# Patient Record
Sex: FEMALE | Race: WHITE | NOT HISPANIC OR LATINO
[De-identification: names, ages, dates, MRNs, and addresses within clinical notes are randomized per-mention and may not be internally consistent; named-entity substitution may affect disease eponyms.]

---

## 2018-03-09 PROBLEM — Z00.00 ENCOUNTER FOR PREVENTIVE HEALTH EXAMINATION: Status: ACTIVE | Noted: 2018-03-09

## 2018-03-12 ENCOUNTER — APPOINTMENT (OUTPATIENT)
Dept: DERMATOLOGY | Facility: CLINIC | Age: 78
End: 2018-03-12
Payer: MEDICARE

## 2018-03-12 VITALS — WEIGHT: 150 LBS | BODY MASS INDEX: 24.99 KG/M2 | HEIGHT: 65 IN

## 2018-03-12 DIAGNOSIS — Z41.1 ENCOUNTER FOR COSMETIC SURGERY: ICD-10-CM

## 2018-03-12 DIAGNOSIS — Z85.89 PERSONAL HISTORY OF MALIGNANT NEOPLASM OF OTHER ORGANS AND SYSTEMS: ICD-10-CM

## 2018-03-12 DIAGNOSIS — Z80.9 FAMILY HISTORY OF MALIGNANT NEOPLASM, UNSPECIFIED: ICD-10-CM

## 2018-03-12 DIAGNOSIS — Z85.828 PERSONAL HISTORY OF OTHER MALIGNANT NEOPLASM OF SKIN: ICD-10-CM

## 2018-03-12 DIAGNOSIS — Z87.891 PERSONAL HISTORY OF NICOTINE DEPENDENCE: ICD-10-CM

## 2018-03-12 DIAGNOSIS — F41.9 ANXIETY DISORDER, UNSPECIFIED: ICD-10-CM

## 2018-03-12 PROCEDURE — 17003 DESTRUCT PREMALG LES 2-14: CPT

## 2018-03-12 PROCEDURE — 17000 DESTRUCT PREMALG LESION: CPT

## 2018-03-12 PROCEDURE — D0051: CPT

## 2019-06-12 ENCOUNTER — APPOINTMENT (OUTPATIENT)
Dept: DERMATOLOGY | Facility: CLINIC | Age: 79
End: 2019-06-12
Payer: MEDICARE
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
## 2019-06-12 DIAGNOSIS — L30.8 OTHER SPECIFIED DERMATITIS: ICD-10-CM

## 2019-06-12 PROCEDURE — 99213 OFFICE O/P EST LOW 20 MIN: CPT

## 2019-06-12 RX ORDER — CEPHALEXIN 500 MG/1
500 CAPSULE ORAL
Qty: 20 | Refills: 0 | Status: COMPLETED | COMMUNITY
Start: 2019-01-29

## 2019-06-12 RX ORDER — ATORVASTATIN CALCIUM 20 MG/1
20 TABLET, FILM COATED ORAL
Qty: 14 | Refills: 0 | Status: ACTIVE | COMMUNITY
Start: 2019-03-19

## 2019-06-12 RX ORDER — FLUTICASONE PROPIONATE 0.5 MG/G
0.05 CREAM TOPICAL TWICE DAILY
Qty: 1 | Refills: 2 | Status: ACTIVE | COMMUNITY
Start: 2019-06-12 | End: 1900-01-01

## 2019-06-12 RX ORDER — ATORVASTATIN CALCIUM 10 MG/1
10 TABLET, FILM COATED ORAL
Refills: 0 | Status: DISCONTINUED | COMMUNITY
End: 2019-06-12

## 2019-06-12 RX ORDER — FLUTICASONE PROPIONATE 50 UG/1
50 SPRAY, METERED NASAL
Qty: 48 | Refills: 0 | Status: COMPLETED | COMMUNITY
Start: 2019-01-24

## 2019-06-12 NOTE — HISTORY OF PRESENT ILLNESS
[FreeTextEntry1] : Tick bite. [de-identified] : Patient found tick this AM on the left thigh, c/o red rash.  She brought in the tick with her (small deer tick).  No itching or irritation.

## 2019-06-12 NOTE — ASSESSMENT
[FreeTextEntry1] : Tick bite - deer tick.\par Education on ECM - she will call if any rash.\par Check lyme titer in 2-3 weeks.\par \par Asteototic eczema\par Education.\par cutivate cream bid.

## 2019-06-12 NOTE — PHYSICAL EXAM
[Alert] : alert [Oriented x 3] : ~L oriented x 3 [Well Nourished] : well nourished [Eyelids] : Eyelids [Ears] : Ears [Lips] : Lips [Neck] : Neck [FreeTextEntry3] : Erythematous macule, 4 mm, of the left lateral thigh.\par \par Xerotic, erythematous patch of the right dorsal foot.

## 2020-04-01 ENCOUNTER — APPOINTMENT (OUTPATIENT)
Dept: DERMATOLOGY | Facility: CLINIC | Age: 80
End: 2020-04-01

## 2021-01-14 ENCOUNTER — APPOINTMENT (OUTPATIENT)
Dept: OBGYN | Facility: CLINIC | Age: 81
End: 2021-01-14
Payer: MEDICARE

## 2021-01-14 VITALS
RESPIRATION RATE: 18 BRPM | BODY MASS INDEX: 24.32 KG/M2 | WEIGHT: 146 LBS | SYSTOLIC BLOOD PRESSURE: 128 MMHG | DIASTOLIC BLOOD PRESSURE: 74 MMHG | HEIGHT: 65 IN | OXYGEN SATURATION: 98 %

## 2021-01-14 DIAGNOSIS — Z12.31 ENCOUNTER FOR SCREENING MAMMOGRAM FOR MALIGNANT NEOPLASM OF BREAST: ICD-10-CM

## 2021-01-14 DIAGNOSIS — N76.2 ACUTE VULVITIS: ICD-10-CM

## 2021-01-14 DIAGNOSIS — Z01.419 ENCOUNTER FOR GYNECOLOGICAL EXAMINATION (GENERAL) (ROUTINE) W/OUT ABNORMAL FINDINGS: ICD-10-CM

## 2021-01-14 PROCEDURE — G0101: CPT

## 2021-01-14 RX ORDER — CLOTRIMAZOLE AND BETAMETHASONE DIPROPIONATE 10; .5 MG/G; MG/G
1-0.05 CREAM TOPICAL TWICE DAILY
Qty: 1 | Refills: 1 | Status: ACTIVE | COMMUNITY
Start: 2021-01-14 | End: 1900-01-01

## 2021-01-14 RX ORDER — DIAZEPAM 10 MG/1
10 TABLET ORAL
Refills: 0 | Status: DISCONTINUED | COMMUNITY
End: 2021-01-14

## 2021-01-14 RX ORDER — VALACYCLOVIR 1 G/1
1 TABLET, FILM COATED ORAL
Qty: 21 | Refills: 0 | Status: DISCONTINUED | COMMUNITY
Start: 2019-06-10 | End: 2021-01-14

## 2021-01-14 NOTE — PHYSICAL EXAM
[Appropriately responsive] : appropriately responsive [Alert] : alert [No Acute Distress] : no acute distress [Soft] : soft [Non-tender] : non-tender [Non-distended] : non-distended [No HSM] : No HSM [No Lesions] : no lesions [No Mass] : no mass [Oriented x3] : oriented x3 [Examination Of The Breasts] : a normal appearance [No Masses] : no breast masses were palpable [Vulvar Atrophy] : vulvar atrophy [Labia Majora] : normal [Labia Majora Erythema] : erythema [Labia Minora] : normal [Atrophy] : atrophy [Normal] : normal [Tenderness] : nontender [Enlarged ___ wks] : not enlarged [Mass ___ cm] : no uterine mass was palpated [Uterine Adnexae] : non-palpable [No Tenderness] : no tenderness [FreeTextEntry5] : pap done [FreeTextEntry9] : guaiac-

## 2021-01-14 NOTE — HISTORY OF PRESENT ILLNESS
[Previously active] : previously active [TextBox_4] : Last exam about 7 years ago\par Burning and itching on outside of vagina, took azo but didn’t help.\par No burning with urination, no dysuria. No recent abx, no new soaps or detergents, no vag dc, no vb.\par paps normal in the past.\par Takes vit d, exercises [Mammogramdate] : 12/18 [PapSmeardate] : 7 yrs ago [BoneDensityDate] : 4 yrs ago [ColonoscopyDate] : utd w 2-3 yrs

## 2021-01-16 LAB — HPV HIGH+LOW RISK DNA PNL CVX: NOT DETECTED

## 2021-05-13 ENCOUNTER — APPOINTMENT (OUTPATIENT)
Dept: DERMATOLOGY | Facility: CLINIC | Age: 81
End: 2021-05-13
Payer: MEDICARE

## 2021-05-13 PROCEDURE — 99214 OFFICE O/P EST MOD 30 MIN: CPT

## 2021-05-13 RX ORDER — FLUOCINONIDE 0.5 MG/G
0.05 CREAM TOPICAL TWICE DAILY
Qty: 1 | Refills: 2 | Status: ACTIVE | COMMUNITY
Start: 2021-05-13 | End: 1900-01-01

## 2021-05-13 NOTE — ASSESSMENT
[FreeTextEntry1] : Rash - ? etiology.\par Discomfort is more significant than the mild clinical presentation.\par IM kenelog today, given the degree of irritation and progression.\par Lidex cream bid.\par Check labs today.\par Will call patient with lab results.\par No reasonable location for bx today - will follow.\par Patient had ? bx elsewhere, will try to get bx results.

## 2021-05-13 NOTE — HISTORY OF PRESENT ILLNESS
[FreeTextEntry1] : Patient fit in for rash of the chest and UE's. [de-identified] : Worse with sun exposure.  Irritated over the past weeks.  Tx'ed elsewhere with TAC cream without improvement.  Began on the chest, and progressed to the UE's.

## 2021-05-13 NOTE — PHYSICAL EXAM
[FreeTextEntry3] : erythematous macules and patches, without significant scale, of the V-neck region.  Erythematous macules, showing only telangiectasias on ELM, of the upper arms.

## 2021-07-06 ENCOUNTER — APPOINTMENT (OUTPATIENT)
Dept: DERMATOLOGY | Facility: CLINIC | Age: 81
End: 2021-07-06
Payer: MEDICARE

## 2021-07-06 DIAGNOSIS — R21 RASH AND OTHER NONSPECIFIC SKIN ERUPTION: ICD-10-CM

## 2021-07-06 DIAGNOSIS — E01.0 IODINE-DEFICIENCY RELATED DIFFUSE (ENDEMIC) GOITER: ICD-10-CM

## 2021-07-06 PROCEDURE — 99213 OFFICE O/P EST LOW 20 MIN: CPT | Mod: 25

## 2021-07-06 PROCEDURE — 11104 PUNCH BX SKIN SINGLE LESION: CPT

## 2021-07-06 RX ORDER — ALBUTEROL SULFATE 90 UG/1
108 (90 BASE) INHALANT RESPIRATORY (INHALATION)
Qty: 7 | Refills: 0 | Status: ACTIVE | COMMUNITY
Start: 2021-04-03

## 2021-07-06 RX ORDER — KETOCONAZOLE 20.5 MG/ML
2 SHAMPOO, SUSPENSION TOPICAL
Qty: 120 | Refills: 0 | Status: ACTIVE | COMMUNITY
Start: 2021-04-24

## 2021-07-06 RX ORDER — ACETAMINOPHEN 325 MG/1
325 TABLET ORAL
Qty: 20 | Refills: 0 | Status: ACTIVE | COMMUNITY
Start: 2021-04-03

## 2021-07-06 RX ORDER — MULTIVITAMIN WITH FOLIC ACID 400 MCG
TABLET ORAL
Qty: 5 | Refills: 0 | Status: ACTIVE | COMMUNITY
Start: 2021-04-03

## 2021-07-06 RX ORDER — ZINC SULFATE 50(220)MG
220 (50 ZN) CAPSULE ORAL
Qty: 5 | Refills: 0 | Status: ACTIVE | COMMUNITY
Start: 2021-04-03

## 2021-07-06 RX ORDER — ASPIRIN 81 MG/1
81 TABLET, CHEWABLE ORAL
Qty: 30 | Refills: 0 | Status: ACTIVE | COMMUNITY
Start: 2021-04-03

## 2021-07-06 RX ORDER — TRIAMCINOLONE ACETONIDE 1 MG/G
0.1 CREAM TOPICAL
Qty: 454 | Refills: 0 | Status: ACTIVE | COMMUNITY
Start: 2021-04-24

## 2021-07-06 RX ORDER — UBIDECARENONE 200 MG
500 CAPSULE ORAL
Qty: 15 | Refills: 0 | Status: ACTIVE | COMMUNITY
Start: 2021-04-03

## 2021-07-06 RX ORDER — METHYLPREDNISOLONE 4 MG/1
4 TABLET ORAL
Qty: 21 | Refills: 0 | Status: ACTIVE | COMMUNITY
Start: 2021-04-03

## 2021-07-06 NOTE — ASSESSMENT
[FreeTextEntry1] : Alopecia, r/o scarring process such as LPP.\par Will check TSH given mildly enlarged thyroid.\par Check labs.\par \par Rash from the prior appt, mostly resolved.\par Emollients recommended.

## 2021-07-06 NOTE — PHYSICAL EXAM
[Alert] : alert [Oriented x 3] : ~L oriented x 3 [Well Nourished] : well nourished [FreeTextEntry3] : Marked decrease in density with apparent scarring of the vertex of the scalp, with some peripheral follicular erythema.\par \par Mild thyromegaly.

## 2021-07-06 NOTE — HISTORY OF PRESENT ILLNESS
[FreeTextEntry1] : Hair loss, progressive over the past 10 months. [de-identified] : Denies pruritus.  Marked hair loss, with significant thinning on the vertex of the scalp.\par Denies itching.

## 2021-07-12 LAB — CORE LAB BIOPSY: NORMAL

## 2021-07-13 ENCOUNTER — APPOINTMENT (OUTPATIENT)
Dept: DERMATOLOGY | Facility: CLINIC | Age: 81
End: 2021-07-13
Payer: MEDICARE

## 2021-07-13 DIAGNOSIS — L65.9 NONSCARRING HAIR LOSS, UNSPECIFIED: ICD-10-CM

## 2021-07-13 PROCEDURE — 99214 OFFICE O/P EST MOD 30 MIN: CPT

## 2021-07-13 RX ORDER — HYDROCORTISONE BUTYRATE 1 MG/ML
0.1 SOLUTION TOPICAL
Qty: 2 | Refills: 3 | Status: ACTIVE | COMMUNITY
Start: 2021-07-13 | End: 1900-01-01

## 2021-07-13 NOTE — ASSESSMENT
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

## 2021-07-13 NOTE — HISTORY OF PRESENT ILLNESS
[FreeTextEntry1] : Sutures removed. [de-identified] : Scalp is well healed, without evidence of infection.\par Sutures removed.

## 2021-09-21 ENCOUNTER — APPOINTMENT (OUTPATIENT)
Dept: DERMATOLOGY | Facility: CLINIC | Age: 81
End: 2021-09-21

## 2021-11-11 ENCOUNTER — APPOINTMENT (OUTPATIENT)
Dept: DERMATOLOGY | Facility: CLINIC | Age: 81
End: 2021-11-11
Payer: MEDICARE

## 2021-11-11 DIAGNOSIS — L81.4 OTHER MELANIN HYPERPIGMENTATION: ICD-10-CM

## 2021-11-11 DIAGNOSIS — L57.0 ACTINIC KERATOSIS: ICD-10-CM

## 2021-11-11 DIAGNOSIS — L21.9 SEBORRHEIC DERMATITIS, UNSPECIFIED: ICD-10-CM

## 2021-11-11 PROCEDURE — 17003 DESTRUCT PREMALG LES 2-14: CPT

## 2021-11-11 PROCEDURE — 17000 DESTRUCT PREMALG LESION: CPT

## 2021-11-11 PROCEDURE — 99213 OFFICE O/P EST LOW 20 MIN: CPT | Mod: 25

## 2021-11-11 RX ORDER — HYDROCORTISONE 25 MG/ML
2.5 LOTION TOPICAL TWICE DAILY
Qty: 1 | Refills: 1 | Status: ACTIVE | COMMUNITY
Start: 2021-11-11 | End: 1900-01-01

## 2021-11-11 NOTE — PHYSICAL EXAM
[FreeTextEntry3] : keratotic erythematous papules, left malar, nasal bridge, forehead x 2.\par \par Erythematous macules, patches, of the forehead and glabellar region, with scale of the frontal hairline.\par \par Lentigines, face.

## 2021-11-11 NOTE — ASSESSMENT
[FreeTextEntry1] : Seb derm\par Education.\par Hytone lotion bid prn.\par \par Lentigines\par Benign.\par Sunscreens.\par Briefly discussed IPL tx.  Cosmetic.